# Patient Record
Sex: MALE | Race: WHITE | NOT HISPANIC OR LATINO | URBAN - METROPOLITAN AREA
[De-identification: names, ages, dates, MRNs, and addresses within clinical notes are randomized per-mention and may not be internally consistent; named-entity substitution may affect disease eponyms.]

---

## 2021-02-14 ENCOUNTER — WEB ENCOUNTER (OUTPATIENT)
Dept: URBAN - METROPOLITAN AREA CLINIC 35 | Facility: CLINIC | Age: 26
End: 2021-02-14

## 2021-02-15 ENCOUNTER — OFFICE VISIT (OUTPATIENT)
Dept: URBAN - METROPOLITAN AREA CLINIC 33 | Facility: CLINIC | Age: 26
End: 2021-02-15

## 2021-02-15 VITALS
HEIGHT: 72 IN | WEIGHT: 242 LBS | OXYGEN SATURATION: 98 % | SYSTOLIC BLOOD PRESSURE: 132 MMHG | HEART RATE: 83 BPM | BODY MASS INDEX: 32.78 KG/M2 | TEMPERATURE: 97.6 F | DIASTOLIC BLOOD PRESSURE: 86 MMHG

## 2021-02-15 PROBLEM — 249504006 FLATULENCE: Status: ACTIVE | Noted: 2021-02-15

## 2021-02-15 PROBLEM — 88111009 CHANGE IN BOWEL HABIT: Status: ACTIVE | Noted: 2021-02-15

## 2021-02-15 NOTE — HPI-MIGRATED HPI
;   ;   ;     Bloating : Patient complains of bloating. Patient admits symptoms are present throughout the day.  Patient admits symptoms have been present for the past year.   Patient denies taking any OTC medications to relieve symptoms. Patient describes symptoms as gas pain in lower left abdomen . Patient admits excess flatulance .    Patient denies having any previous breath test including H. Pylori or SIBO ..  ;   Abdominal Tenderness :                        25 year old male patient presents for abdominal discomfort consult. Patient admits he has been experiencing symptoms for the past year. Patient describes symptoms as Lower left abdominal discomfort . Patient admits symptoms are presents at diffeent times throughout the day .  He denies any current medications for releif of symptoms .He admits associated flatulance .   He admits symptoms dont wake him during the night but can prevent him from falling asleep. Patient denies nausea or vomiting. Patient denies having any recent imaging.   Patient denies EGD in the past.   ;   Alternating Bowel Habits :  Patient presents for change in bowel habits consult . Patient admits he has been experiecing alternating bowel habits for the past year. He admits bowel movements vary throughout the week . He admits stools are soft and mushy at times and sometimes he has hard stools causing strenuos bowel movements .     Denies taking recent antibiotics. Patient denies any recent stools studies . Patient denies blood in stools, melena , or mucus . Patient denies previous colonoscopy .;

## 2021-02-15 NOTE — EXAM-MIGRATED EXAMINATIONS
GENERAL APPEARANCE: - alert, in no acute distress, well developed, well nourished;   HEAD: - normocephalic, atraumatic;   ORAL CAVITY: - mucosa moist;   HEART: - no murmurs, regular rate and rhythm, S1, S2 normal;   LUNGS: - clear to auscultation bilaterally, good air movement, no wheezes, rales, rhonchi;   ABDOMEN: - bowel sounds present, no masses palpable, no organomegaly , no rebound tenderness, soft, nontender, nondistended;

## 2021-02-24 LAB
IMMUNOGLOBULIN A: 117
INTERPRETATION: (no result)
TISSUE TRANSGLUTAMINASE$AB, IGA: 1

## 2021-02-25 ENCOUNTER — TELEPHONE ENCOUNTER (OUTPATIENT)
Dept: URBAN - METROPOLITAN AREA CLINIC 35 | Facility: CLINIC | Age: 26
End: 2021-02-25

## 2021-03-08 ENCOUNTER — OFFICE VISIT (OUTPATIENT)
Dept: URBAN - METROPOLITAN AREA CLINIC 33 | Facility: CLINIC | Age: 26
End: 2021-03-08

## 2021-03-08 VITALS
WEIGHT: 245 LBS | SYSTOLIC BLOOD PRESSURE: 122 MMHG | BODY MASS INDEX: 33.18 KG/M2 | HEIGHT: 72 IN | OXYGEN SATURATION: 99 % | DIASTOLIC BLOOD PRESSURE: 80 MMHG | HEART RATE: 76 BPM

## 2021-03-08 NOTE — HPI-MIGRATED HPI
;   ;   ;     Bloating : Patient admits continuing bloating episodes since his last visit.      Last visit (2/15/2021)  Patient complains of bloating. Patient admits symptoms are present throughout the day.  Patient admits symptoms have been present for the past year.   Patient denies taking any OTC medications to relieve symptoms. Patient describes symptoms as gas pain in lower left abdomen . Patient admits excess flatulence .    Patient denies having any previous breath test including H. Pylori or SIBO ..  ;   Abdominal Tenderness : Patient presents today for a follow up of abdominal tenderness and to review his labs. Patient admits symptoms are still present since his last visit.  Patient admits conitinuing LLQ discomfort/tenderness and excess flatulence .        Last visit (2/15/2021)  25 year old male patient presents for abdominal discomfort consult. Patient admits he has been experiencing symptoms for the past year. Patient describes symptoms as Lower left abdominal discomfort . Patient admits symptoms are presents at diffeent times throughout the day .  He denies any current medications for releif of symptoms .He admits associated flatulance .   He admits symptoms dont wake him during the night but can prevent him from falling asleep. Patient denies nausea or vomiting.  Patient denies having any recent imaging.   Patient denies EGD in the past.   ;   Alternating Bowel Habits : Patient denies that his bowel habits have improved. Patient currently reports 1-2 soft and mushy bowel movements without blood, mucus, or melena.     Last visit (2/15/2021)  Patient presents for change in bowel habits consult . Patient admits he has been experiecing alternating bowel habits for the past year. He admits bowel movements vary throughout the week . He admits stools are soft and mushy at times and sometimes he has hard stools causing strenuos bowel movements .     Denies taking recent antibiotics. Patient denies any recent stools studies . Patient denies blood in stools, melena , or mucus . Patient denies previous colonoscopy .;

## 2021-05-10 ENCOUNTER — OFFICE VISIT (OUTPATIENT)
Dept: URBAN - METROPOLITAN AREA CLINIC 33 | Facility: CLINIC | Age: 26
End: 2021-05-10

## 2021-05-10 NOTE — HPI-MIGRATED HPI
;   ;   ;     Bloating :  Patient presents for follow up . He admits/denies improvement since last visit .  Of last visit (03/08/2021)Patient admits continuing bloating episodes since his last visit.      Last visit (2/15/2021)  Patient complains of bloating. Patient admits symptoms are present throughout the day.  Patient admits symptoms have been present for the past year.   Patient denies taking any OTC medications to relieve symptoms. Patient describes symptoms as gas pain in lower left abdomen . Patient admits excess flatulence .    Patient denies having any previous breath test including H. Pylori or SIBO ..;   Abdominal Tenderness : Patient presents for follow up of abdominal tenderness . Patient admits /denies improvement since last visit .     Of last visit (03/08/2021) Patient presents today for a follow up of abdominal tenderness and to review his labs. Patient admits symptoms are still present since his last visit.  Patient admits conitinuing LLQ discomfort/tenderness and excess flatulence .        Last visit (2/15/2021)  25 year old male patient presents for abdominal discomfort consult. Patient admits he has been experiencing symptoms for the past year. Patient describes symptoms as Lower left abdominal discomfort . Patient admits symptoms are presents at diffeent times throughout the day .  He denies any current medications for releif of symptoms .He admits associated flatulance .   He admits symptoms dont wake him during the night but can prevent him from falling asleep. Patient denies nausea or vomiting.  Patient denies having any recent imaging.   Patient denies EGD in the past.;   Alternating Bowel Habits : Patient admits /denies improvement at this time . Patient admits having __bowel movements a day with __ stools .   He admits /denies rectal bleeding at this time .     Of last visit (03/08/2021)Patient denies that his bowel habits have improved. Patient currently reports 1-2 soft and mushy bowel movements without blood, mucus, or melena.     Last visit (2/15/2021)  Patient presents for change in bowel habits consult . Patient admits he has been experiecing alternating bowel habits for the past year. He admits bowel movements vary throughout the week . He admits stools are soft and mushy at times and sometimes he has hard stools causing strenuos bowel movements .     Denies taking recent antibiotics. Patient denies any recent stools studies . Patient denies blood in stools, melena , or mucus . Patient denies previous colonoscopy .;

## 2021-05-26 ENCOUNTER — OFFICE VISIT (OUTPATIENT)
Dept: URBAN - METROPOLITAN AREA CLINIC 33 | Facility: CLINIC | Age: 26
End: 2021-05-26

## 2021-05-26 ENCOUNTER — DASHBOARD ENCOUNTERS (OUTPATIENT)
Age: 26
End: 2021-05-26

## 2021-05-26 VITALS — HEART RATE: 71 BPM | WEIGHT: 245 LBS | BODY MASS INDEX: 33.18 KG/M2 | OXYGEN SATURATION: 97 % | HEIGHT: 72 IN

## 2021-05-26 NOTE — HPI-MIGRATED HPI
;   ;   ;     Bloating : Patient presents for follow up . He admits improvement since last visit . Patient he admits he is still having symptoms and doesnt really understand why simple foods are causing an isssue . Such as peanuts and popcorn  Of last visit (03/08/2021)Patient admits continuing bloating episodes since his last visit.      Last visit (2/15/2021)  Patient complains of bloating. Patient admits symptoms are present throughout the day.  Patient admits symptoms have been present for the past year.   Patient denies taking any OTC medications to relieve symptoms. Patient describes symptoms as gas pain in lower left abdomen . Patient admits excess flatulence .    Patient denies having any previous breath test including H. Pylori or SIBO ..;   Abdominal Tenderness : Patient presents for follow up of abdominal tenderness . Patient admits improvement since last visit . He admits watching diet.     Of last visit (03/08/2021) Patient presents today for a follow up of abdominal tenderness and to review his labs. Patient admits symptoms are still present since his last visit.  Patient admits conitinuing LLQ discomfort/tenderness and excess flatulence .        Last visit (2/15/2021)  25 year old male patient presents for abdominal discomfort consult. Patient admits he has been experiencing symptoms for the past year. Patient describes symptoms as Lower left abdominal discomfort . Patient admits symptoms are presents at diffeent times throughout the day .  He denies any current medications for releif of symptoms .He admits associated flatulance .   He admits symptoms dont wake him during the night but can prevent him from falling asleep. Patient denies nausea or vomiting.  Patient denies having any recent imaging.   Patient denies EGD in the past.;   Alternating Bowel Habits : Patient admits improvement   He denies rectal bleeding at this time .     Of last visit (03/08/2021)Patient denies that his bowel habits have improved. Patient currently reports 1-2 soft and mushy bowel movements without blood, mucus, or melena.     Last visit (2/15/2021)  Patient presents for change in bowel habits consult . Patient admits he has been experiecing alternating bowel habits for the past year. He admits bowel movements vary throughout the week . He admits stools are soft and mushy at times and sometimes he has hard stools causing strenuos bowel movements .     Denies taking recent antibiotics. Patient denies any recent stools studies . Patient denies blood in stools, melena , or mucus . Patient denies previous colonoscopy .;

## 2023-05-08 ENCOUNTER — APPOINTMENT (RX ONLY)
Dept: URBAN - METROPOLITAN AREA CLINIC 5 | Facility: CLINIC | Age: 28
Setting detail: DERMATOLOGY
End: 2023-05-08

## 2023-05-08 DIAGNOSIS — L50.3 DERMATOGRAPHIC URTICARIA: ICD-10-CM | Status: INADEQUATELY CONTROLLED

## 2023-05-08 PROCEDURE — ? OTHER

## 2023-05-08 PROCEDURE — ? COUNSELING

## 2023-05-08 PROCEDURE — 99204 OFFICE O/P NEW MOD 45 MIN: CPT

## 2023-05-08 PROCEDURE — ? PRESCRIPTION

## 2023-05-08 RX ORDER — BETAMETHASONE VALERATE 1.2 MG/G
AEROSOL, FOAM TOPICAL
Qty: 1 | Refills: 0 | Status: ERX | COMMUNITY
Start: 2023-05-08

## 2023-05-08 RX ADMIN — BETAMETHASONE VALERATE: 1.2 AEROSOL, FOAM TOPICAL at 00:00

## 2023-05-08 NOTE — PROCEDURE: OTHER
Detail Level: Detailed
Note Text (......Xxx Chief Complaint.): This diagnosis correlates with the
Other (Free Text): Filled out labs for pt. Allergy labs.
Render Risk Assessment In Note?: no